# Patient Record
(demographics unavailable — no encounter records)

---

## 2025-02-24 NOTE — HISTORY OF PRESENT ILLNESS
[FreeTextEntry1] : f/u  [de-identified] : 55 year old female presents for f/u. She complains of weight gain. Gained 10lbs in the past month.  She complains of intermittent SOB. 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 Had complications of post op infx but being monitored and now resolved.

## 2025-02-24 NOTE — HISTORY OF PRESENT ILLNESS
[FreeTextEntry1] : f/u  [de-identified] : 55 year old female presents for f/u. She complains of weight gain. Gained 10lbs in the past month.  She complains of intermittent SOB. 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 Had complications of post op infx but being monitored and now resolved.

## 2025-02-24 NOTE — PLAN
[FreeTextEntry1] : Bloating - f/u abd US - f/u labs  GERD - trial of omeprazole - GI f/u   Hyperpigmented lesion under nail  - derm referral   Asthma - stable  Weight gain - f/u labs

## 2025-02-24 NOTE — PHYSICAL EXAM
[No Acute Distress] : no acute distress [Well-Appearing] : well-appearing [Normal Sclera/Conjunctiva] : normal sclera/conjunctiva [Normal Outer Ear/Nose] : the outer ears and nose were normal in appearance [No Respiratory Distress] : no respiratory distress  [No Accessory Muscle Use] : no accessory muscle use [Clear to Auscultation] : lungs were clear to auscultation bilaterally [Normal Rate] : normal rate  [Regular Rhythm] : with a regular rhythm [Soft] : abdomen soft [Non Tender] : non-tender [Non-distended] : non-distended [Normal Bowel Sounds] : normal bowel sounds [No Focal Deficits] : no focal deficits [Normal Affect] : the affect was normal [Normal Insight/Judgement] : insight and judgment were intact

## 2025-07-14 NOTE — CONSULT LETTER
[Dear  ___] : Dear  [unfilled], [Courtesy Letter:] : I had the pleasure of seeing your patient, [unfilled], in my office today. [Please see my note below.] : Please see my note below. [Sincerely,] : Sincerely, [FreeTextEntry2] : Dr. Susanna Dhillon [FreeTextEntry3] : Dave Shaw MD Otolaryngology at [address], [address] [place] [address] Phone: [phone] Fax: [phone]

## 2025-07-14 NOTE — ASSESSMENT
[FreeTextEntry1] : PND and LPR.  Take PPI daily. Reflux precautions and behavioral modifications were discussed with patient including weight loss, the avoidance of tobacco as well as mint, chocolate, caffeine, alcohol, citrus, tomato based and spicy food ingestion, avoidance of eating within 3-4 hours of retiring for sleep, and elevation of the head of the bed. Start Flonase and Astelin and OTC Nasal saline rinse kit. Oral care with tongue scraper and OTC non-alcohol mouth rinse for tonsil stones. Seek attention for hemoptysis, bleeding, dysphagia, SOB/difficulty breathing, significant cough, voice change greater than 2 weeks, throat pain, fever, chills, neck swelling, redness of neck. Followup 2 months

## 2025-07-14 NOTE — PROCEDURE
[de-identified] : Laryngoscopy: Verbal consent obtained. Nasopharynx clear without masses Base of tongue without masses or lesions Vallecula clear Pyriforms clear Epiglottis crisp Arytenoids normal TVFs mobile bilaterally without masses or lesions Glottic airway patent Interarytenoid edema Postcricoid edema   Patient tolerated procedure well.

## 2025-07-14 NOTE — PHYSICAL EXAM
[de-identified] : congestion present [Midline] : trachea located in midline position [de-identified] : lateral pharyngeal mucosal cobblestoning present  [Normal] : no neck adenopathy DISPLAY PLAN FREE TEXT

## 2025-07-14 NOTE — HISTORY OF PRESENT ILLNESS
[de-identified] : 55 year old female here for dysphonia, tonsil stones, intermittent odynophagia, dyspnea, and throat irritation for 6 months. Patient denies dysphagia or otalgia. Pt states ears feel slightly clogged. Pt reports recent viral/URI treated with 2 rounds of steroids and cefdinir. Pt takes omeprazole PRN. History of smoking, quit 30 years ago. Occasional alcohol use.